# Patient Record
Sex: FEMALE
[De-identification: names, ages, dates, MRNs, and addresses within clinical notes are randomized per-mention and may not be internally consistent; named-entity substitution may affect disease eponyms.]

---

## 2019-03-19 ENCOUNTER — HOSPITAL ENCOUNTER (EMERGENCY)
Dept: HOSPITAL 14 - H.ER | Age: 19
Discharge: HOME | End: 2019-03-19
Payer: SELF-PAY

## 2019-03-19 VITALS — SYSTOLIC BLOOD PRESSURE: 130 MMHG | TEMPERATURE: 98.3 F | HEART RATE: 79 BPM | DIASTOLIC BLOOD PRESSURE: 82 MMHG

## 2019-03-19 VITALS — RESPIRATION RATE: 16 BRPM

## 2019-03-19 VITALS — BODY MASS INDEX: 43.1 KG/M2

## 2019-03-19 DIAGNOSIS — J06.9: Primary | ICD-10-CM

## 2019-03-19 LAB
BACTERIA #/AREA URNS HPF: (no result) /[HPF]
BILIRUB UR-MCNC: NEGATIVE MG/DL
COLOR UR: YELLOW
GLUCOSE UR STRIP-MCNC: (no result) MG/DL
LEUKOCYTE ESTERASE UR-ACNC: (no result) LEU/UL
PH UR STRIP: 7 [PH] (ref 5–8)
PROT UR STRIP-MCNC: NEGATIVE MG/DL
RBC # UR STRIP: NEGATIVE /UL
SP GR UR STRIP: 1.02 (ref 1–1.03)
SQUAMOUS EPITHIAL: 18 /HPF (ref 0–5)
URINE CLARITY: (no result)
UROBILINOGEN UR-MCNC: (no result) MG/DL (ref 0.2–1)

## 2019-03-19 NOTE — ED PDOC
History of Present Illness


History of Present Illness: 


18 y/o female with no significant PMHx presents to the ED for evaluation of a 

subjective fever, onset three days ago. Patient reports fever is associated with

chills, a dry cough, sore throat and ear pain (R > L) for the past three days. 

Patient notes of taking Dayquil with no relief of symptoms. Patient denies sick 

contacts and recent travel. 





PMD: no provider





HPI: Influenza


Time Seen by Provider: 03/19/19 21:40


Chief Complaint: Chest Pain


Chief Complaint (Provider): Flu-like symptoms


History Per: Patient


Exam Limitations: no limitations


Have you had recent travel within the past 21 days to any of: No


Onset/Duration Of Symptoms: Days (x3)


Symptoms include: fever, sore throat, cough


Sick Contacts (Context): None





Past Medical History


Reviewed: Historical Data, Nursing Documentation, Vital Signs


Vital Signs: 





                                Last Vital Signs











Temp  98.4 F   03/19/19 21:30


 


Pulse  98 H  03/19/19 21:30


 


Resp  16   03/19/19 21:30


 


BP  156/91 H  03/19/19 21:30


 


Pulse Ox  100   03/19/19 21:30














- Medical History


PMH: No Chronic Diseases


   Denies: HIV, Chronic Kidney Disease





- Surgical History


Surgical History: No Surg Hx





- Family History


Family History: States: Unknown Family Hx





- Social History


Current smoker - smoking cessation education provided: No





- Home Medications


Home Medications: 


                                Ambulatory Orders











 Medication  Instructions  Recorded


 


Docusate [Colace] 100 mg PO BID #14 cap 03/07/18


 


Ferrous Sulfate [Feosol] 325 mg PO BID #60 tab 03/07/18


 


Ibuprofen [Motrin Tab] 800 mg PO Q6 #30 tab 03/07/18


 


oxyCODONE/Acetaminophen [Percocet 1 ea PO Q6 PRN #12 tab 03/07/18





5/325 mg Tab]  


 


Ibuprofen [Motrin Tab] 600 mg PO Q6 #30 tab 03/19/19


 


Prednisone [Deltasone] 40 mg PO DAILY 3 Days #6 tablet 03/19/19














- Allergies


Allergies/Adverse Reactions: 


                                    Allergies











Allergy/AdvReac Type Severity Reaction Status Date / Time


 


No Known Allergies Allergy   Verified 03/19/19 21:30














Review of Systems


ROS Statement: Except As Marked, All Systems Reviewed And Found Negative


Constitutional: Positive for: Fever, Chills


ENT: Positive for: Ear Pain, Throat Pain


Respiratory: Positive for: Cough





Physical Exam





- Reviewed


Nursing Documentation Reviewed: Yes


Vital Signs Reviewed: Yes





- Physical Exam


Appears: Positive for: Well


Head Exam: Positive for: ATRAUMATIC


Skin: Positive for: Normal Color, Warm, Dry


Eye Exam: Positive for: Normal appearance, EOMI, PERRL


ENT: Positive for: TM Is/Are (Right TM: Erythema of the right TM with no pus. 

Left TM: Normal)


Neck: Positive for: Normal, Painless ROM, Supple


Cardiovascular/Chest: Positive for: Regular Rate, Rhythm.  Negative for: Murmur


Respiratory: Positive for: Normal Breath Sounds.  Negative for: Respiratory 

Distress


Gastrointestinal/Abdominal: Positive for: Normal Exam, Soft.  Negative for: 

Tenderness


Back: Positive for: Normal Inspection.  Negative for: L CVA Tenderness, R CVA 

Tenderness


Extremity: Positive for: Normal ROM.  Negative for: Deformity


Neurological/Psych: Positive for: Awake, Alert, Oriented.  Negative for: 

Motor/Sensory Deficits





Medical Decision Making


Medical Decision Making: 


Time: 2200


A/P: 18 y/o well appearing female with Upper Respiratory symptoms. 


-- Likely representative of a viral illness


-- Do not suspect viral infection


-- CXR ordered to rule out pneumonia





-- EKG


-- ED Urine Dipstick


-- ED Urine Pregnancy


-- CXR Two Views


-- Glucose, POC


-- Motrin 600 mg PO


-- PredniSONE 40 mg PO


-- Glucose, Blood, POC


-- Urinalysis





Time: 2342


-- On re-evaluation, patient appears comfortable and in no acute distress. 

Patient reports an improvement of symptoms. CXR demonstrates no acute findings. 

On exam, abdomen is soft, non-tender.  


 

________________________________________________________________________________


___________


Scribe Attestation:


Documented by Haylie Shell, acting as a scribe for Solo Strickland MD.





Provider Scribe Attestation:


All medical record entries made by the Scribe were at my direction and 

personally dictated by me. I have reviewed the chart and agree that the record 

accurately reflects my personal performance of the history, physical exam, 

medical decision making, and the department course for this patient. I have also

 personally directed, reviewed, and agree with the discharge instructions and 

disposition.





- Laboratory Results


Lab Results: 





                                        











Urine Color  Yellow  (YELLOW)   03/19/19  22:25    


 


Urine Clarity  Cloudy  (Clear)   03/19/19  22:25    


 


Urine pH  7.0  (5.0-8.0)   03/19/19  22:25    


 


Ur Specific Gravity  1.023  (1.003-1.030)   03/19/19  22:25    


 


Urine Protein  Negative mg/dL (NEGATIVE)   03/19/19  22:25    


 


Urine Glucose (UA)  Neg mg/dL (NEGATIVE)   03/19/19  22:25    


 


Urine Ketones  Negative mg/dL (NEGATIVE)   03/19/19  22:25    


 


Urine Blood  Negative  (NEGATIVE)   03/19/19  22:25    


 


Urine Nitrate  Negative  (NEGATIVE)   03/19/19  22:25    


 


Urine Bilirubin  Negative  (NEGATIVE)   03/19/19  22:25    


 


Urine Urobilinogen  0.2-1.0 mg/dL (0.2-1.0)   03/19/19  22:25    


 


Ur Leukocyte Esterase  Mod Matt/uL (Negative)   03/19/19  22:25    


 


Urine RBC (Auto)  1 /hpf (0-3)   03/19/19  22:25    


 


Urine Microscopic WBC  9 /hpf (0-5)  H  03/19/19  22:25    


 


Ur Squamous Epith Cells  18 /hpf (0-5)  H  03/19/19  22:25    


 


Urine Bacteria  Rare  (<OCC)   03/19/19  22:25    














- ECG


O2 Sat by Pulse Oximetry: 100 (RA)


Pulse Ox Interpretation: Normal





Disposition





- Clinical Impression


Clinical Impression: 


 Upper respiratory infection








- Disposition


Referrals: 


[facility] [Outside]


Disposition: Routine/Home


Disposition Time: 23:40


Condition: IMPROVED


Prescriptions: 


Ibuprofen [Motrin Tab] 600 mg PO Q6 #30 tab


Prednisone [Deltasone] 40 mg PO DAILY 3 Days #6 tablet


Instructions:  Viral Upper Respiratory Infection, Adult (DC)


Forms:  [facility] (English)

## 2019-03-20 VITALS — OXYGEN SATURATION: 100 %

## 2019-03-20 NOTE — RAD
Date of service: 



03/19/2019



HISTORY:

 cough, fever 



COMPARISON:

Comparison chest 3/2/18



TECHNIQUE:

Chest PA and lateral



FINDINGS:



LUNGS:

No active pulmonary disease.



PLEURA:

No significant pleural effusion identified. No pneumothorax apparent.



CARDIOVASCULAR:

No aortic atherosclerotic calcification present.



Normal cardiac size. No pulmonary vascular congestion. 



OSSEOUS STRUCTURES:

No significant abnormalities.



VISUALIZED UPPER ABDOMEN:

Normal.



OTHER FINDINGS:

None.



IMPRESSION:

No active disease.

## 2019-03-20 NOTE — CARD
--------------- APPROVED REPORT --------------





Date of service: 03/19/2019



EKG Measurement

Heart [id number]

RI [id number]

[id number]

[id number]

[id number]



<Conclusion>

Normal sinus rhythm

Normal ECG